# Patient Record
Sex: FEMALE | Race: WHITE | Employment: FULL TIME | URBAN - METROPOLITAN AREA
[De-identification: names, ages, dates, MRNs, and addresses within clinical notes are randomized per-mention and may not be internally consistent; named-entity substitution may affect disease eponyms.]

---

## 2018-05-14 ENCOUNTER — HOSPITAL ENCOUNTER (OUTPATIENT)
Dept: LAB | Age: 43
Discharge: HOME | End: 2018-05-14

## 2018-05-14 LAB — SENTARA SPECIMEN COL,SENBCF: NORMAL

## 2018-05-14 PROCEDURE — 99001 SPECIMEN HANDLING PT-LAB: CPT | Performed by: PHYSICIAN ASSISTANT

## 2018-07-10 ENCOUNTER — HOSPITAL ENCOUNTER (OUTPATIENT)
Dept: GENERAL RADIOLOGY | Age: 43
Discharge: HOME | End: 2018-07-10
Payer: COMMERCIAL

## 2018-07-10 DIAGNOSIS — M54.50 LUMBAGO: ICD-10-CM

## 2018-07-10 PROCEDURE — 72100 X-RAY EXAM L-S SPINE 2/3 VWS: CPT

## 2018-07-20 ENCOUNTER — HOSPITAL ENCOUNTER (OUTPATIENT)
Dept: PHYSICAL THERAPY | Age: 43
Discharge: HOME | End: 2018-07-20
Payer: COMMERCIAL

## 2018-07-20 PROCEDURE — 97161 PT EVAL LOW COMPLEX 20 MIN: CPT

## 2018-07-20 PROCEDURE — 97110 THERAPEUTIC EXERCISES: CPT

## 2018-07-20 NOTE — PROGRESS NOTES
PHYSICAL THERAPY - DAILY TREATMENT NOTE    Patient Name: Gary Bashir        Date: 2018  : 1975   YES Patient  Verified  Visit #:   1     Insurance: Payor: Mariam Comment / Plan: [employer] HMO / Product Type: HMO /      In time: 10:10 Out time: 10:55   Total Treatment Time: 45     Medicare Time Tracking (below)   Total Timed Codes (min):  NA 1:1 Treatment Time:  NA     TREATMENT AREA = Low back pain [M54.5]    SUBJECTIVE    Pain Level (on 0 to 10 scale):  0  / 10   Medication Changes/New allergies or changes in medical history, any new surgeries or procedures? YES    If yes, update Summary List   Subjective Functional Status/Changes:  []  No changes reported   CC: Low back pain    LAVERN/HPI: Pt states she has had chronic low back for a couple years but it would come then go away. Pt states she was \"crooked at one point. \" Pt states her back pain typically increases when she sits too long or bending and lifting without being too careful. On , she was gardening, raking the leaves, and other yard tasks. She attempted to start the  (which she doesn't do) and that's when her low back started hurting. Pt states when she woke up on  she had increased low back pain and was leaning to the right. 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 Pt reports sensitivity in the front of the leg. Pt states she will occasionally feel \"throbbing\" into right foot. Pt denies changes in bowel/bladder. Pt states since the incident her low back pain is progressively getting better. Pt sees chiropractor every 4 weeks. Pain  Today:0/10  Best:0/10  Worst:4/10    Aggravating factors:Prolonged sitting, driving,     Relieving Factors:Lay on belly propped on elbows, prone pressups, or lay on right side with pillow/blanket under right hip, Ice. MDT based exercises provided by chiropractor. Past History/Treatments: Chiropractic care every 4 weeks to low back maintenance.     Pain with functional activities:  [] Yes [x] No Sit<>stand  [x] Yes [] No Squatting  [x] Yes [] No Lifting  [x] Yes [] No Pushing/pulling  [x] Yes [] No   Chores/yardwork  [] Yes [x] No Stairs  [] Yes [x] No Walking    Diagnostic Tests: [] Lab work [x] X-rays    [] CT [] MRI     [] Other:  Results: degenerative changes in low back. Chiropractor stated she has a bulging disc based on x-ray. OBJECTIVE  Observation/Posture: Forward held head, rounded shoulders, mild kyphotic posture. Pelvic symmetry: Normal pelvic symmetry noted in seated and standing position. No TTP over bilateral SIJ. Palpation:Pt TTP and increased tissue turgor along right L/S paraspinals compared to left. Gait: No major gait deficits noted    Squat mechanics: Dynamic valgus noted bilaterally. Active Movements:   ROM  AROM % PROM Comments:pain, area   Forward flexion 40-60 75  Decreased ability to reverse L/S lordotic curvature. Extension 20-30 33     SB right 20-30 18     SB left 20-30 30     Rotation right 5-10 75%     Rotation left 5-10 100%       Repeated Movements   KENTRELL: centralized symptoms into Distal ITB from right foot.   KATE: centralized symptoms into right hip  REIL: centralized symptoms into level of L/S    LE Strength   Left Right   Hip flexion 4+/5 4/5   Hip abduction 4+/5 4-/5   Hip extension 4+/5 4/5   Hip IR 4+/5 4/5   Hip ER 4+/5 4-/5   Knee extension 4+/5 4+/5   Knee flexion 4+/5 4+/5   Ankle PF 4+/5 4+/5   Ankle DF 4+/5 4+/5     Dural Mobility:  SLR Sitting: [] R    [] L    [] +    [] -  @ (degrees):           Supine: [x] R    [x] L    [] +    [x] -  @ (degrees):   Slump Test: [x] R    [x] L    [] +    [x] -  @ (degrees):           Flexibility Deficits: Matias's: [] R    [] L    [] +    [] -     Matty: [] R    [] L    [] +    [] -     Hamstrings 90/90: (+) bilaterally, left: mild right: moderate tightness      Therapeutic Procedures:  Min Procedure Specifics + Rationale   n/a [x]  Patient Education (performed throughout session) [x] Review HEP    [] Progressed/Changed HEP based on:   [] proper performance and advancement of Therex/TA   [] reduction in pain level    [] increased functional capacity       [] change in directional preference   10 [x] Therapeutic Exercise   [x]  See Flowsheet   Rationale: increase ROM and increase strength to improve the patients ability to participate in ADL's      Other Objective/Functional Measures:    PT initiated extension  Based faby program. Pt demonstrated and verbalized independence with HEP. Pt left PT session with symptoms centralized into L/S     Post Treatment Pain Level (on 0 to 10) scale:   0  / 10     ASSESSMENT    Assessment/Changes in Function:See POC     Justification for Eval Code Complexity: low  Patient History (low 0, mod 1-2, high 3-4): Moderate: chronic low erna pain with recent exacerbation   Examination (low 1-2, mod 3+, high 4+): Moderate: decreased L/S mobility, decreased right HS flexibility, decreased activity tolerance. Clinical Presentation (low: stable/uncomplicated; mod: evolving; high: unstable/unpredictable): Low  Clinical Decision Making (low , mod 26-74, high 1-25):  Moderate: FOTO: 54    [x]  See Plan of Care  []  See Progress Note/ Recertification  []  See Discharge Summary        Patient will continue to benefit from skilled PT services to modify and progress therapeutic interventions, address functional mobility deficits, address ROM deficits, address strength deficits, analyze and address soft tissue restrictions, analyze and cue movement patterns, analyze and modify body mechanics/ergonomics, assess and modify postural abnormalities, address imbalance/dizziness and instruct in home and community integration  to attain remaining goals   Progress toward goals / Updated goals:    See POC     PLAN    [x]  Upgrade activities as tolerated  [x]  Update interventions per flow sheet YES Continue plan of care   []  Discharge due to :    [] Other:      Therapist: Cesar Valdes DPT    Date: 7/20/2018 Time: 7:44 AM   Future Appointments  Date Time Provider Aga Velazco   7/20/2018 10:00 AM Kamari Bui PT [facility]

## 2018-07-20 NOTE — PROGRESS NOTES
[address] [facility] PHYSICAL THERAPY  [address] [facility] [address], [address] - Phone: [phone]  Fax: [phone] / [address] [facility]  Patient Name: Nima Solitario : 1975   Medical   Diagnosis: Low back pain [M54.5] Treatment Diagnosis: L/S radiculopathy   Onset Date: Chronic with exacerbation 18     Referral Source: Shirley Lindo MD [facility] [place]): 2018   Prior Hospitalization: See medical history Provider #: [id number]   Prior Level of Function: Independent, recreationally active 3+x weekly   Comorbidities: Chronic low back pain   Medications: Verified on Patient Summary List   The Plan of Care and following information is based on the information from the initial evaluation.   ===========================================================================================  Assessment / key information:  Pt is a 44year old female who presents to PT today with c/o of chronic low back pain with recent exacerbation on 7/3/18 after attempting to start . Upon evaluation, signs and symptoms consistent with L/S radiculopathy. Pt presents with decreased L/S AROM, radicular symptoms into dorsal surface of right LE post prolonged seated positioning, decreased LE flexibility and decreased LE proximal hip strength. Pt states he has stopped all recreational activity besides daily walking program due to increase in low back pain. Pt responded well to MDT based exercises and demonstrated ability to centralize symptoms to the level of the L/S. Pt would benefit from skilled PT to address functional deficits listed above and facilitate return to prior level of recreational activity. OBJECTIVE  Observation/Posture: Forward held head, rounded shoulders, mild kyphotic posture.      Pelvic symmetry: Normal pelvic symmetry noted in seated and standing position.  No TTP over bilateral SIJ.      Palpation:Pt TTP and increased tissue turgor along right L/S paraspinals compared to left.      Gait: No major gait deficits noted     Squat mechanics: Dynamic valgus noted bilaterally.     Active Movements:   ROM  AROM % PROM Comments:pain, area   Forward flexion 40-60 75   Decreased ability to reverse L/S lordotic curvature. Extension 20-30 33       SB right 20-30 18       SB left 20-30 30       Rotation right 5-10 75%       Rotation left 5-10 100%          Repeated Movements   KENTRELL: centralized symptoms into Distal ITB from right foot.   KATE: centralized symptoms into right hip  REIL: centralized symptoms into level of L/S     LE Strength    Left Right   Hip flexion 4+/5 4/5   Hip abduction 4+/5 4-/5   Hip extension 4+/5 4/5   Hip IR 4+/5 4/5   Hip ER 4+/5 4-/5   Knee extension 4+/5 4+/5   Knee flexion 4+/5 4+/5   Ankle PF 4+/5 4+/5   Ankle DF 4+/5 4+/5      Dural Mobility:  SLR Sitting:               [] R    [] L    [] +    [] -  @ (degrees):           Supine:              [x] R    [x] L    [] +    [x] -  @ (degrees):   Slump Test:               [x] R    [x] L    [] +    [x] -  @ (degrees):            Flexibility Deficits:                     Matias's:           [] R    [] L    [] +    [] -                                               Matty:                    [] R    [] L    [] +    [] -                                               Hamstrings 90/90: (+) bilaterally, left: mild right: moderate tightness       ===========================================================================================  Eval Complexity: History: MEDIUM  Complexity : 1-2 comorbidities / personal factors will impact the outcome/ POC Exam:MEDIUM Complexity : 3 Standardized tests and measures addressing body structure, function, activity limitation and / or participation in recreation  Presentation: LOW Complexity : Stable, uncomplicated  Clinical Decision Making:MEDIUM Complexity : FOTO score of 26-74Overall Complexity:LOW     Problem List: pain affecting function, decrease ROM, decrease strength, decrease ADL/ functional abilitiies, decrease activity tolerance and decrease flexibility/ joint mobility   Treatment Plan may include any combination of the following: Therapeutic exercise, Therapeutic activities, Neuromuscular re-education, Physical agent/modality, Gait/balance training, Manual therapy, Aquatic therapy, Patient education, Self Care training, Functional mobility training, Home safety training and Stair training  Patient / Family readiness to learn indicated by: asking questions, trying to perform skills and interest  Persons(s) to be included in education: patient (P)  Barriers to Learning/Limitations: None  Measures taken: NA   Patient Goal (s): Return to prior level of recreational activity   Patient self reported health status: excellent  Rehabilitation Potential: excellent   Short Term Goals: To be accomplished in  2  weeks:  1. Pt will be compliant with HEP for symptom management at home. 2. Pt will demonstrate independence with ability centralize radicular symptoms to level of the L/S in order to increase compliance with PT program.  3. Pt will return to prior level of recreational activity at least 1 day per week in order to facilitate return to PLOF.  Long Term Goals: To be accomplished in  4  weeks:  1. Pt will be independent with HEP at D/C for self management. 2. Pt will tolerate a weeks worth of desired recreational activity with </=1/10 low back pain in order to return to PLOF  3. Pt to increase FOTO score to > 75 to indicate improved functional independence. 4. Pt will increase right hip abduction strength to at least 4/5 in order to increase pelvic stability during stance phase of gait.   Frequency / Duration:   Patient to be seen  2  times per week for 4-6  weeks:  Patient / Caregiver education and instruction: self care, activity modification and exercises  G-Codes (GP): NA  Therapist Signature: Alex Fabian PT Date: 0/92/9645   Certification Period: NA Time: 7:46 AM   ===========================================================================================  I certify that the above Physical Therapy Services are being furnished while the patient is under my care. I agree with the treatment plan and certify that this therapy is necessary. Physician Signature:        Date:       Time:     Please sign and return to [facility] or you may fax the signed copy to [phone]. Thank you.

## 2018-07-25 ENCOUNTER — HOSPITAL ENCOUNTER (OUTPATIENT)
Dept: PHYSICAL THERAPY | Age: 43
Discharge: HOME | End: 2018-07-25
Payer: COMMERCIAL

## 2018-07-25 PROCEDURE — 97110 THERAPEUTIC EXERCISES: CPT

## 2018-07-25 PROCEDURE — 97140 MANUAL THERAPY 1/> REGIONS: CPT

## 2018-07-25 NOTE — PROGRESS NOTES
PHYSICAL THERAPY - DAILY TREATMENT NOTE    Patient Name: Gary Bashir        Date: 2018  : 1975   YES Patient  Verified  Visit #:   2   of     Insurance: Payor: Mariam Comment / Plan: [employer] HMO / Product Type: HMO /      In time: 10:00 Out time: 10:41   Total Treatment Time: 41     TREATMENT AREA = Low back pain [M54.5]    SUBJECTIVE    Pain Level (on 0 to 10 scale):  1  / 10   Medication Changes/New allergies or changes in medical history, any new surgeries or procedures? NO    If yes, update Summary List   Subjective Functional Status/Changes:  []  No changes reported     \"I've been able to get the pain all the way to my back but not that often. It's in the top (1/3) of the hip/thigh right now (anterolaterally)\"          OBJECTIVE  Therapeutic Procedures:  Min Procedure Specifics + Rationale   n/a [x]  Patient Education (performed throughout session) [x] Review HEP    [] Progressed/Changed HEP based on:   [] proper performance and advancement of Therex/TA   [] reduction in pain level    [] increased functional capacity       [] change in directional preference   23 [x] Therapeutic Exercise   [x]  See Flowsheet   Rationale: increase ROM and increase strength to improve the patients ability to participate in ADL's    8 []  Manual Therapy [] STM/DTM     [] IASTM     [] Scar Massage  [] X-friction       [] PNF         [] PROM    [] TDN (see objective; actual needle insertion time not billed)   [] Soft tissue mobz   [] Joint mobz: Gr I [] II []  III [] IV[] V[]:  DQNH with OP;  Extension mobilization in prone    Rationale: decrease pain, increase ROM, increase tissue extensibility, decrease trigger points and increase postural awareness to attain functional use and participation with ADL's     Modality rationale: decrease inflammation, decrease pain, increase tissue extensibility and increase muscle contraction/control to improve the patients ability to perform ADL's with greater ease   Min Type Additional Details   10 [x]  Heat          [] pre-ABDIRAHMAN          [x] post-ABDIRAHMAN Location:L/S    [] supine              [] prone  [] legs elevated    [] legs flat   [] Skin assessment post-treatment:  []intact []redness- no adverse reaction       []redness  adverse reaction:     Other Objective/Functional Measures:    Patient educated on purpose of centralization, neurophysiological effects, and biomechanical effects in relation to her condition. Patient educated on use of HEP as a method to further centralize and reduce symptoms independently outside of clinic and therapist intervention. She verbalized understanding that central symptoms are an improvement over peripheral symptoms. Post Treatment Pain Level (on 0 to 10) scale:   1C  / 10     ASSESSMENT    Assessment/Changes in Function:     Posterior derangement appears to be confirmed         Patient will continue to benefit from skilled PT services to modify and progress therapeutic interventions, address functional mobility deficits, address ROM deficits, address strength deficits, analyze and address soft tissue restrictions, analyze and cue movement patterns, instruct in home and community integration and radiculopathy  to attain remaining goals   Progress toward goals / Updated goals:    1st session since initial eval, no significant progress noted. PLAN    [x]  Upgrade activities as tolerated  [x]  Update interventions per flow sheet YES Continue plan of care   []  Discharge due to :    []  Other:      Therapist: Vicky Brito \"BJ\" GIUSEPPE Kelly, Cert. MDT, Cert. DN, Cert.  SMT    Date: 7/25/2018 Time: 10:02 AM   Future Appointments  Date Time Provider Aga Velazco   7/27/2018 10:00 AM Burma Erasmo, [facility]   8/3/2018 1:00 PM Burma Erasmo, [facility]   8/8/2018 9:30 AM Burma Erasmo, [facility]   8/10/2018 10:00 AM Burma Erasmo, [facility]   8/15/2018 9:30 AM Burma Erasmo, [facility]   8/17/2018 10:00 AM Burma Erasmo, [facility]   8/22/2018 9:30 AM Kanika Dee PT [facility]   8/24/2018 10:30 AM Kanika Dee, PT [facility]   8/29/2018 9:30 AM Kanika Dee, PT [facility]   8/31/2018 10:30 AM Kanika Dee, PT [facility]

## 2018-07-27 ENCOUNTER — HOSPITAL ENCOUNTER (OUTPATIENT)
Dept: PHYSICAL THERAPY | Age: 43
Discharge: HOME | End: 2018-07-27
Payer: COMMERCIAL

## 2018-07-27 PROCEDURE — 97140 MANUAL THERAPY 1/> REGIONS: CPT

## 2018-07-27 PROCEDURE — 97110 THERAPEUTIC EXERCISES: CPT

## 2018-07-27 NOTE — PROGRESS NOTES
PHYSICAL THERAPY - DAILY TREATMENT NOTE Patient Name: Keara Ferguson        Date: 2018 : 1975   YES Patient  Verified Visit #:   3   of     Insurance: Payor: Jake Morataya / Plan: [place] [employer] H[employer] / Product Type: HMO /   
 
In time: 10:03 Out time: 10:50 Total Treatment Time: 52 TREATMENT AREA = Low back pain [M54.5] SUBJECTIVE Pain Level (on 0 to 10 scale):  0  / 10 Medication Changes/New allergies or changes in medical history, any new surgeries or procedures? NO    If yes, update Summary List  
Subjective Functional Status/Changes:  []  No changes reported \"Very very little down the foot/leg today. \"  
 
  
 
OBJECTIVE Therapeutic Procedures: 
Min Procedure Specifics + Rationale  
n/a [x]  Patient Education (performed throughout session) [x] Review HEP [] Progressed/Changed HEP based on:  
[] proper performance and advancement of Therex/TA [] reduction in pain level   
[] increased functional capacity [] change in directional preference 29 [x] Therapeutic Exercise [x]  See Flowsheet Rationale: increase ROM and increase strength to improve the patients ability to participate in ADL's   
8 []  Manual Therapy [] STM/DTM     [] IASTM     [] Scar Massage [] X-friction       [] PNF         [] PROM [] TDN (see objective; actual needle insertion time not billed)  
[] Soft tissue mobz  
[] Joint mobz: Gr I [] II []  III [] IV[] V[]: 
LQMF with OP; Extension mobilization in prone Rationale: decrease pain, increase ROM, increase tissue extensibility, decrease trigger points and increase postural awareness to attain functional use and participation with ADL's Modality rationale: decrease inflammation, decrease pain, increase tissue extensibility and increase muscle contraction/control to improve the patients ability to perform ADL's with greater ease Min Type Additional Details 10 [x]  Heat          [] pre-ABDIRAHMAN          [x] post-ABDIRAHMAN Location:L/S   
[] supine [x] prone 
[] legs elevated    [] legs flat  
[] Skin assessment post-treatment:  []intact []redness- no adverse reaction 
     []redness  adverse reaction:  
 
Other Objective/Functional Measures: Added stabilization therex per flow sheet. Report of peripheralization during s/l R clams. Performed KENTRELL/REIL throughout treatment b/w sets/reps/exercises as needed as prophylaxis and symptoms management. Educated patient of various forms of extensions using plinth/counter top, hanging on mobility sticks, and extensions in water. Post Treatment Pain Level (on 0 to 10) scale:   0  / 10 ASSESSMENT Assessment/Changes in Function:  
 
Derangement continuing to improve in reducing Patient will continue to benefit from skilled PT services to modify and progress therapeutic interventions, address functional mobility deficits, address ROM deficits, address strength deficits, analyze and address soft tissue restrictions, analyze and cue movement patterns, analyze and modify body mechanics/ergonomics and instruct in home and community integration  to attain remaining goals Progress toward goals / Updated goals: 
 
Progressing well inc centralization PLAN [x]  Upgrade activities as tolerated 
[x]  Update interventions per flow sheet YES Continue plan of care  
[]  Discharge due to :   
[]  Other:   
 
Therapist: Austin Allen \"BJ\" GIUSEPPE Kelly, Cert. MDT, Cert. DN, Cert. SMT Date: 7/27/2018 Time: 10:02 AM  
Future Appointments Date Time Provider Aga Velazco 8/3/2018 1:00 PM Divina Salazar PT [facility]  
8/8/2018 9:30 AM Divina Salazar PT [facility]  
8/10/2018 10:00 AM Divina Salazar PT [facility]  
8/15/2018 9:30 AM Divina Salazar PT [facility]  
8/17/2018 10:00 AM Divina Salazar PT [facility]  
8/22/2018 9:30 AM Divina Salazar PT [facility]  
8/24/2018 10:30 AM Divina Salazar PT [facility]  
8/29/2018 9:30 AM Divina Salazar PT [facility]  
8/31/2018 10:30 AM Divina Salazar PT [facility]

## 2018-08-03 ENCOUNTER — HOSPITAL ENCOUNTER (OUTPATIENT)
Dept: PHYSICAL THERAPY | Age: 43
Discharge: HOME | End: 2018-08-03
Payer: COMMERCIAL

## 2018-08-03 PROCEDURE — 97110 THERAPEUTIC EXERCISES: CPT

## 2018-08-08 ENCOUNTER — HOSPITAL ENCOUNTER (OUTPATIENT)
Dept: PHYSICAL THERAPY | Age: 43
Discharge: HOME | End: 2018-08-08
Payer: COMMERCIAL

## 2018-08-08 PROCEDURE — 97530 THERAPEUTIC ACTIVITIES: CPT

## 2018-08-08 PROCEDURE — 97110 THERAPEUTIC EXERCISES: CPT

## 2018-08-08 NOTE — PROGRESS NOTES
PHYSICAL THERAPY - DAILY TREATMENT NOTE    Patient Name: Samuel Chatman        Date: 2018  : 1975   YES Patient  Verified  Visit #:   5     Insurance: Payor: Eunice Castaneda / Plan: [place] [employer] / Product Type: HMO /      In time: 9:31 Out time: 10:21   Total Treatment Time: 50     TREATMENT AREA = Low back pain [M54.5]    SUBJECTIVE    Pain Level (on 0 to 10 scale):  0  / 10   Medication Changes/New allergies or changes in medical history, any new surgeries or procedures? NO    If yes, update Summary List   Subjective Functional Status/Changes:  []  No changes reported     \"I've definitely been working on my glutes, which were sore. \"          OBJECTIVE  Therapeutic Procedures:  Min Procedure Specifics + Rationale   n/a [x]  Patient Education (performed throughout session) [x] Review HEP    [] Progressed/Changed HEP based on:   [] proper performance and advancement of Therex/TA   [] reduction in pain level    [] increased functional capacity       [] change in directional preference   32 [x] Therapeutic Exercise   [x]  See Flowsheet   Rationale: increase ROM and increase strength to improve the patients ability to participate in ADL's    8 [x] Therapeutic Activity   [x]  See Flowsheet; Introduced [address] and progressed quadruped therex for anti-rotational stability for housework  Rationale:  To improve safety, proprioception, coordination, and efficiency with tasks     Modality rationale: decrease inflammation, decrease pain, increase tissue extensibility and increase muscle contraction/control to improve the patients ability to perform ADL's with greater ease   Min Type Additional Details   10 [x]  Heat          [] pre-ABDIRAHMAN          [x] post-ABDIRAHMAN Location:L/S    [] supine              [x] prone  [x] legs elevated    [] legs flat   [] Skin assessment post-treatment:  []intact []redness- no adverse reaction       []redness  adverse reaction:     Other Objective/Functional Measures:    Increased reps/sets/resistance per flow sheet. Post Treatment Pain Level (on 0 to 10) scale:   0  / 10     ASSESSMENT    Assessment/Changes in Function:     Derangement reduced          Patient will continue to benefit from skilled PT services to modify and progress therapeutic interventions, address functional mobility deficits, address ROM deficits, address strength deficits, analyze and address soft tissue restrictions, analyze and cue movement patterns, analyze and modify body mechanics/ergonomics and instruct in home and community integration  to attain remaining goals   Progress toward goals / Updated goals:    Progressing in functional mobility     PLAN    [x]  Upgrade activities as tolerated  [x]  Update interventions per flow sheet YES Continue plan of care   []  Discharge due to :    []  Other:      Therapist: Virgil Obrien \"SHAHID\" Sana Matta, GIUSEPPE, Cert. MDT, Cert. DN, Cert.  SMT    Date: 8/8/2018 Time: 9:37 AM   Future Appointments  Date Time Provider Aga Velazco   8/10/2018 10:00 AM Vivienne Poag, [facility]   8/15/2018 9:30 AM Vivienne Poag, PT [facility]   8/17/2018 10:00 AM Vivienne Poag, PT [facility]   8/22/2018 9:30 AM Vivienne Poag, PT [facility]   8/24/2018 10:30 AM Vivienne Poag, PT [facility]   8/29/2018 9:30 AM Vivienne Poag, PT [facility]   8/31/2018 10:30 AM Vivienne Poag, PT [facility]

## 2018-08-10 ENCOUNTER — HOSPITAL ENCOUNTER (OUTPATIENT)
Dept: PHYSICAL THERAPY | Age: 43
Discharge: HOME | End: 2018-08-10
Payer: COMMERCIAL

## 2018-08-10 PROCEDURE — 97110 THERAPEUTIC EXERCISES: CPT

## 2018-08-10 NOTE — PROGRESS NOTES
PHYSICAL THERAPY - DAILY TREATMENT NOTE    Patient Name: Radha Frey        Date: 8/10/2018  : 1975   YES Patient  Verified  Visit #:   6     Insurance: Payor: Glenn Single / Plan: [facility] HMO / Product Type: HMO /      In time: 10:00 Out time: 10:55   Total Treatment Time: 55     TREATMENT AREA = Low back pain [M54.5]    SUBJECTIVE    Pain Level (on 0 to 10 scale):  3  / 10   Medication Changes/New allergies or changes in medical history, any new surgeries or procedures? NO    If yes, update Summary List   Subjective Functional Status/Changes:  []  No changes reported     \"Overnight I had some trouble with my back. I woke up this morning and did some exercises and it felt better, but right now I've got some sensation to my thigh.  \"          OBJECTIVE  Therapeutic Procedures:  Min Procedure Specifics + Rationale   n/a [x]  Patient Education (performed throughout session) [x] Review HEP    [] Progressed/Changed HEP based on:   [] proper performance and advancement of Therex/TA   [] reduction in pain level    [] increased functional capacity       [] change in directional preference   45 [x] Therapeutic Exercise   [x]  See Flowsheet   Rationale: increase ROM and increase strength to improve the patients ability to participate in ADL's      Modality rationale: decrease inflammation, decrease pain, increase tissue extensibility and increase muscle contraction/control to improve the patients ability to perform ADL's with greater ease   Min Type Additional Details   10 [x]  Heat          [] pre-ABDIRAHMAN          [x] post-ABDIRAHMAN Location:L/S    [] supine              [x] prone  [] legs elevated    [] legs flat   [] Skin assessment post-treatment:  []intact []redness- no adverse reaction       []redness  adverse reaction:     Other Objective/Functional Measures:    Educated patient on transitional exercises like Yoga  Able to independently centralize and abolish via MDT   Post Treatment Pain Level (on 0 to 10) scale:   0  / 10     ASSESSMENT    Assessment/Changes in Function:     Patient derangement still able to reduce despite recent exacerbation         Patient will continue to benefit from skilled PT services to modify and progress therapeutic interventions, address functional mobility deficits, address ROM deficits, address strength deficits, analyze and address soft tissue restrictions, analyze and cue movement patterns and instruct in home and community integration  to attain remaining goals   Progress toward goals / Updated goals:    Progressing well despite recent exacerbation     PLAN    [x]  Upgrade activities as tolerated  [x]  Update interventions per flow sheet YES Continue plan of care   []  Discharge due to :    []  Other:      Therapist: Jeannie Iqbal \"BJ\" Robin, GIUSEPPE, Cert. MDT, Cert. DN, Cert.  SMT    Date: 8/10/2018 Time: 10:16 AM   Future Appointments  Date Time Provider Aga Velazco   8/15/2018 9:30 AM Germain Hoover PT [facility]   8/17/2018 10:00 AM Thor Kiko PT [facility]   8/22/2018 9:30 AM Thor Kiko PT [facility]   8/24/2018 10:30 AM Thor Kiko, PT [facility]   8/29/2018 9:30 AM Thor Kiko PT [facility]   8/31/2018 10:30 AM Germain Hoover, PT [facility]

## 2018-08-15 ENCOUNTER — HOSPITAL ENCOUNTER (OUTPATIENT)
Dept: PHYSICAL THERAPY | Age: 43
Discharge: HOME | End: 2018-08-15
Payer: COMMERCIAL

## 2018-08-15 PROCEDURE — 97530 THERAPEUTIC ACTIVITIES: CPT

## 2018-08-15 PROCEDURE — 97110 THERAPEUTIC EXERCISES: CPT

## 2018-08-15 NOTE — PROGRESS NOTES
PHYSICAL THERAPY - DAILY TREATMENT NOTE    Patient Name: Davon Dougherty        Date: 8/15/2018  : 1975   YES Patient  Verified  Visit #:     Insurance: Payor: Samuel Reeves / Plan: [address] [place] [address] [place] HMO / Product Type: HMO /      In time: 9:30 Out time: 10:16   Total Treatment Time: 46     TREATMENT AREA = Low back pain [M54.5]    SUBJECTIVE    Pain Level (on 0 to 10 scale): \"tingling\"  / 10   Medication Changes/New allergies or changes in medical history, any new surgeries or procedures? NO    If yes, update Summary List   Subjective Functional Status/Changes:  []  No changes reported     \"Back and legs are fine, but I was doing bird dogs this morning and had tingling in my right hand. \"          OBJECTIVE  Therapeutic Procedures:  Min Procedure Specifics + Rationale   n/a [x]  Patient Education (performed throughout session) [x] Review HEP    [] Progressed/Changed HEP based on:   [] proper performance and advancement of Therex/TA   [] reduction in pain level    [] increased functional capacity       [] change in directional preference   23 [x] Therapeutic Exercise   [x]  See Flowsheet   Rationale: increase ROM and increase strength to improve the patients ability to participate in ADL's    5 []  Manual Therapy [] STM/DTM -(See Details Below)   [] IASTM  (See Table Below)  [] Scar Massage  [] X-friction       [] PNF         [] PROM   [] TDN (see objective; actual needle insertion time not billed)   [] Joint mobilization[de-identified] Gr I [] II []  III [] IV[] V[]: (See Details Below)  Carpal manipulation to right hand to allow for performance of therex  Rationale: decrease pain, increase ROM, increase tissue extensibility, decrease trigger points and increase postural awareness to attain functional use and participation with ADL's  [] Skin assessment post-treatment:  []intact []redness- no adverse reaction   []redness  adverse reaction:      8 [x] Therapeutic Activity   [x]  See Flowsheet; Oov supine march.  UE flexion, Dead bug  Rationale: To improve safety, proprioception, coordination, and efficiency with tasks     Modality rationale: decrease inflammation, decrease pain, increase tissue extensibility and increase muscle contraction/control to improve the patients ability to perform ADL's with greater ease   Min Type Additional Details   10 [x]  Heat          [] pre-ABDIRAHMAN          [x] post-ABDIRAHMAN Location:L/S    [x] supine              [] prone  [x] legs elevated    [] legs flat   [x] Skin assessment post-treatment:  []intact []redness- no adverse reaction       []redness  adverse reaction:     Other Objective/Functional Measures:    Hand paraesthesia abolished after manipulation  Right paraesthesia upon initial rep of LE march on right with oov, but able to abolish when corrected to engage core. Post Treatment Pain Level (on 0 to 10) scale:   0  / 10     ASSESSMENT    Assessment/Changes in Function:     Derangement not fully reduced         Patient will continue to benefit from skilled PT services to modify and progress therapeutic interventions, address functional mobility deficits, address ROM deficits, address strength deficits, analyze and address soft tissue restrictions, analyze and cue movement patterns, analyze and modify body mechanics/ergonomics and instruct in home and community integration  to attain remaining goals   Progress toward goals / Updated goals:    Progressing in centralized state for ADL's     PLAN    [x]  Upgrade activities as tolerated  [x]  Update interventions per flow sheet YES Continue plan of care   []  Discharge due to :    []  Other:      Therapist: Ana María Finch \"BJ\" Karo Valdivia, DPT, Cert. MDT, Cert. DN, Cert.  SMT    Date: 8/15/2018 Time: 10:02 AM   Future Appointments  Date Time Provider Aga Velazco   8/17/2018 10:00 AM [place] Salbador, [facility]   8/22/2018 9:30 AM Dimas Salbador, [facility]   8/24/2018 10:30 AM Dimas Salbador, [facility]   8/29/2018 9:30 AM [place] Salbador, [facility] 8/31/2018 10:30 AM Harjit Castaneda PT [facility]

## 2018-08-17 ENCOUNTER — HOSPITAL ENCOUNTER (OUTPATIENT)
Dept: PHYSICAL THERAPY | Age: 43
Discharge: HOME | End: 2018-08-17
Payer: COMMERCIAL

## 2018-08-17 PROCEDURE — 97110 THERAPEUTIC EXERCISES: CPT

## 2018-08-17 PROCEDURE — 97140 MANUAL THERAPY 1/> REGIONS: CPT

## 2018-08-17 PROCEDURE — 97530 THERAPEUTIC ACTIVITIES: CPT

## 2018-08-17 PROCEDURE — 97112 NEUROMUSCULAR REEDUCATION: CPT

## 2018-08-17 NOTE — PROGRESS NOTES
PHYSICAL THERAPY - DAILY TREATMENT NOTE    Patient Name: Shorty Parra        Date: 2018  : 1975   YES Patient  Verified  Visit #:   8     Insurance: Payor: Nabila Dewey / Plan: [facility] HMO / Product Type: HMO /      In time: 10:02 Out time: 10:58   Total Treatment Time: 58     TREATMENT AREA = Low back pain [M54.5]    SUBJECTIVE    Pain Level (on 0 to 10 scale):  0  / 10   Medication Changes/New allergies or changes in medical history, any new surgeries or procedures? NO    If yes, update Summary List   Subjective Functional Status/Changes:  []  No changes reported     \"Some tingling in my leg today. It started late afternoon/evening. I can't recall, maybe when I folded over forward \"          OBJECTIVE  Therapeutic Procedures:  Min Procedure Specifics + Rationale   n/a [x]  Patient Education (performed throughout session) [x] Review HEP    [] Progressed/Changed HEP based on:   [] proper performance and advancement of Therex/TA   [] reduction in pain level    [] increased functional capacity       [] change in directional preference   15 [x] Therapeutic Exercise   [x]  See Flowsheet   Rationale: increase ROM and increase strength to improve the patients ability to participate in ADL's    15 []  Manual Therapy [x] STM/DTM -(See Details Below)   [x] IASTM  (See Table Below)  [] Scar Massage  [] X-friction       [] PNF         [] PROM   [] TDN (see objective; actual needle insertion time not billed)   [] Joint mobilization[de-identified] Gr I [] II []  III [] IV[] V[]: (See Details Below)  Lumbosacral   Instrument Position Techniques   FRAMS Prone with pillow under umbilicus area  R4/W8 cephalo/caudally. F2/F3 laterally. F4 at the interspinus space    Prone with pillow under umbilicus area O0/B4 over posterior-lateral buttocks. F3/F4 over SI joint line.  F3/F4 laterally and between the L4-S1 spinous process   Cups Prone with pillow under umbilicus area Dynamic cupping to the erector spinae    Prone with pillow under umbilicus area for placement Prone press-up with static cup position. Standing AROM flexion and extension. REIL with OP; Extension mobilization in prone  Rationale: decrease pain, increase ROM, increase tissue extensibility, decrease trigger points and increase postural awareness to attain functional use and participation with ADL's  [] Skin assessment post-treatment:  []intact []redness- no adverse reaction   []redness  adverse reaction:      8 [x] Therapeutic Activity   [x]  See Flowsheet;   Cups Prone with pillow under umbilicus area for placement Prone press-up with static cup position. Standing AROM flexion and extension. Rationale:  To improve safety, proprioception, coordination, and efficiency with tasks     8 min Neuromuscular Re-ed: Loaded carries in varied hand positions bilaterally for time   Rationale:         Modality rationale: decrease inflammation, decrease pain, increase tissue extensibility and increase muscle contraction/control to improve the patients ability to perform ADL's with greater ease   Min Type Additional Details   10 [x]  Heat          [] pre-ABDIRAHMAN          [x] post-ABDIRAHMAN Location:L/S    [] supine              [] prone  [] legs elevated    [] legs flat   [x] Skin assessment post-treatment:  []intact []redness- no adverse reaction       []redness  adverse reaction:     Other Objective/Functional Measures:    Implemented MDT to centralize symptoms prior to manual  Neuro-re-ed post manual     Post Treatment Pain Level (on 0 to 10) scale:   0  / 10     ASSESSMENT    Assessment/Changes in Function:     Derangement exacerbated to L/S able to be reduced and abolished, performed loaded carries with centralized state         Patient will continue to benefit from skilled PT services to modify and progress therapeutic interventions, address functional mobility deficits, address ROM deficits, address strength deficits, analyze and address soft tissue restrictions, analyze and cue movement patterns, analyze and modify body mechanics/ergonomics and instruct in home and community integration  to attain remaining goals   Progress toward goals / Updated goals:    Progressing in derangement reduction, though not fully reduced     PLAN    [x]  Upgrade activities as tolerated  [x]  Update interventions per flow sheet YES Continue plan of care   []  Discharge due to :    []  Other:      Therapist: Jamel Elizalde \"BJ\" GIUSEPPE Kelly, Cert. MDT, Cert. DN, Cert.  SMT    Date: 8/17/2018 Time: 10:10 AM   Future Appointments  Date Time Provider Aga Velazco   8/22/2018 9:30 AM Harjit Castaneda [facility]   8/24/2018 10:30 AM Harjit Castaneda PT [facility]   8/29/2018 9:30 AM Harjit Castaneda PT [facility]   8/31/2018 10:30 AM Harjit Castaneda PT [facility]

## 2018-08-22 ENCOUNTER — HOSPITAL ENCOUNTER (OUTPATIENT)
Dept: PHYSICAL THERAPY | Age: 43
Discharge: HOME | End: 2018-08-22
Payer: COMMERCIAL

## 2018-08-22 PROCEDURE — 97140 MANUAL THERAPY 1/> REGIONS: CPT

## 2018-08-22 PROCEDURE — 97112 NEUROMUSCULAR REEDUCATION: CPT

## 2018-08-22 NOTE — PROGRESS NOTES
[address] [facility] PHYSICAL THERAPY  [address] [facility] [address], [address] - Phone: [phone]  Fax: [phone]  PROGRESS NOTE  Patient Name: Maine Cowart : 1975   Treatment/Medical Diagnosis: Low back pain [M54.5]   Referral Source: Basil Peñaloza MD     Date of Initial Visit: 18 Attended Visits: 9 Missed Visits: 0     SUMMARY OF TREATMENT  Patient's POC has consisted of therex, therapeutic activities, manual therapy prn, modalities prn, pt. education, and a comprehensive HEP. Treatment strategies used to address functional mobility deficits, ROM deficits, strength deficits, analyze and address soft tissue restrictions, analyze and cue movement patterns, analyze and modify body mechanics/ergonomics, assess and modify postural abnormalities and instruct in home and community integration. CURRENT STATUS  Patient making excellent progress in addressing her core stability and paraesthesia. She demonstrates improvement, though continued limitation in proprioception and kinesthetic awareness re: her left LE, resulting in balance dysfunction. Patient revealed hx of C/S derangement as she attended a session with complaints of Right hand paraesthesia. We were able to centralize and abolish her UE paraesthesia and continue in treatment of her L/S without difficulty. Goal/Measure of Progress Goal Met? · Short Term Goals: To be accomplished in  2  weeks:  1. Pt will be compliant with HEP for symptom management at home. 2. Pt will demonstrate independence with ability centralize radicular symptoms to level of the L/S in order to increase compliance with PT program.  3. Pt will return to prior level of recreational activity at least 1 day per week in order to facilitate return to PLOF. · Long Term Goals: To be accomplished in  4  weeks:  1. Pt will be independent with HEP at D/C for self management.   2. Pt will tolerate a weeks worth of desired recreational activity with </=1/10 low back pain in order to return to PLOF  3. Pt to increase FOTO score to > 75 to indicate improved functional independence. 4. Pt will increase right hip abduction strength to at least 4/5 in order to increase pelvic stability during stance phase of gait. MET    MET        MET          Ongoing    Partially Met      Ongoing    MET     New Goals to be achieved in __2-3__  weeks:  1. Pt will be independent with HEP at D/C for self management. 2. Pt will tolerate a weeks worth of desired recreational activity with </=1/10 low back pain in order to return to PLOF  Frequency / Duration:   Patient to be seen  2-3  times per week for 2-3  weeks:    G-Codes: n/a  RECOMMENDATIONS  Continue and progress functional therex/therapeutic activity as able, utilizing manual therapy and modalities prn. Progress patient towards independent HEP to facilitate self-management of symptoms and progress gains after PT. If you have any questions/comments please contact us directly at [phone]. Thank you for allowing us to assist in the care of your patient. Therapist Signature: Jeannie Iqbal \"BJ\" jE Trujillo, DPT, Cert. MDT, Cert. DN, Cert. SMT Date: 2/77/4391   Certification Period: n/a     Reporting Period n/a   Time: 10:07 AM   NOTE TO PHYSICIAN:  PLEASE COMPLETE THE ORDERS BELOW AND FAX TO   [facility]: [phone]. If you are unable to process this request in 24 hours please contact our office: [phone].    ___ I have read the above report and request that my patient continue as recommended.   ___ I have read the above report and request that my patient continue therapy with the following changes/special instructions:_________________________________________________________   ___ I have read the above report and request that my patient be discharged from therapy.      Physician Signature:        Date:       Time:

## 2018-08-22 NOTE — PROGRESS NOTES
PHYSICAL THERAPY - DAILY TREATMENT NOTE    Patient Name: Radha Frey        Date: 2018  : 1975   YES Patient  Verified  Visit #:     Insurance: Payor: Glenn Single / Plan: [facility] HMO / Product Type: HMO /      In time: 9:15 Out time: 10:15   Total Treatment Time: 45     TREATMENT AREA = Low back pain [M54.5]    SUBJECTIVE    Pain Level (on 0 to 10 scale):  0  / 10   Medication Changes/New allergies or changes in medical history, any new surgeries or procedures? NO    If yes, update Summary List   Subjective Functional Status/Changes:  []  No changes reported     \"Just a little sensation to my thigh, but it goes away. I still get some of that numbness to the back of the right hand. \"          OBJECTIVE  Therapeutic Procedures:  Min Procedure Specifics + Rationale   n/a [x]  Patient Education (performed throughout session) [x] Review HEP    [] Progressed/Changed HEP based on:   [] proper performance and advancement of Therex/TA   [] reduction in pain level    [] increased functional capacity       [] change in directional preference   10 []  Manual Therapy [] STM/DTM -(See Details Below)   [] IASTM  (See Table Below)  [] Scar Massage  [] X-friction       [] PNF         [] PROM   [] TDN (see objective; actual needle insertion time not billed)   [] Joint mobilization[de-identified] Gr I [] II []  III [] IV[] V[]: (See Details Below)  Supine repeated retraction mobilization  Cervical (C6-7) left Rotatory HVLAT (Cradle Hold)  REIL with OP; Extension mobilization in prone  Rationale: decrease pain, increase ROM, increase tissue extensibility, decrease trigger points and increase postural awareness to attain functional use and participation with ADL's  [] Skin assessment post-treatment:  []intact []redness- no adverse reaction   []redness  adverse reaction:      25 [x] Neuromuscular Re-ed   [x]  See Flowsheet;   Oov H/L UE/LE march, UE ab/add, BKFO, H/L Clams, S/L Clams, Bird Dog (UE Only), Decompression  Rationale: increase ROM, increase strength, improve coordination, improve balance and increase proprioception  to improve the patients ability to safely participate in ADL's     Modality rationale: decrease inflammation, decrease pain, increase tissue extensibility and increase muscle contraction/control to improve the patients ability to perform ADL's with greater ease   Min Type Additional Details   10 [x]  Heat          [] pre-ABDIRAHMAN          [x] post-ABDIRAHMAN Location:L/S    [x] supine              [] prone  [x] legs elevated    [] legs flat   [x] Skin assessment post-treatment:  []intact []redness- no adverse reaction       []redness  adverse reaction:     Other Objective/Functional Measures:    Assessed Right hand paraesthesia secondary to its hindrance on therex for L/S. Progressed Oov activities per above     Post Treatment Pain Level (on 0 to 10) scale:   0  / 10     ASSESSMENT    Assessment/Changes in Function:     See PN         Patient will continue to benefit from skilled PT services to modify and progress therapeutic interventions, address functional mobility deficits, address ROM deficits, address strength deficits, analyze and address soft tissue restrictions, analyze and cue movement patterns, analyze and modify body mechanics/ergonomics and instruct in home and community integration  to attain remaining goals   Progress toward goals / Updated goals:    See PN     PLAN    [x]  Upgrade activities as tolerated  [x]  Update interventions per flow sheet YES Continue plan of care   []  Discharge due to :    []  Other:      Therapist: Kayla Briceno \"SHAHID\" David Head DPT, Cert. MDT, Cert. DN, Cert.  SMT    Date: 8/22/2018 Time: 9:57 AM   Future Appointments  Date Time Provider Aga Velazco   8/24/2018 10:30 AM Keya Griffin [facility]   8/29/2018 9:30 AM Keya Griffin PT [facility]   8/31/2018 10:30 AM Keya Griffin [facility]

## 2018-08-24 ENCOUNTER — HOSPITAL ENCOUNTER (OUTPATIENT)
Dept: PHYSICAL THERAPY | Age: 43
Discharge: HOME | End: 2018-08-24
Payer: COMMERCIAL

## 2018-08-24 PROCEDURE — 97110 THERAPEUTIC EXERCISES: CPT

## 2018-08-24 PROCEDURE — 97112 NEUROMUSCULAR REEDUCATION: CPT

## 2018-08-24 NOTE — PROGRESS NOTES
PHYSICAL THERAPY - DAILY TREATMENT NOTE    Patient Name: Cal Graham        Date: 2018  : 1975   YES Patient  Verified  Visit #:   10   of   12  Insurance: Payor: Norm Gupta / Plan: [employer] [facility] HMO / Product Type: HMO /      In time: 10:30 Out time: 11:05   Total Treatment Time: 35     TREATMENT AREA = Low back pain [M54.5]    SUBJECTIVE    Pain Level (on 0 to 10 scale):  0  / 10   Medication Changes/New allergies or changes in medical history, any new surgeries or procedures? NO    If yes, update Summary List   Subjective Functional Status/Changes:  []  No changes reported     \"I'm feeling a lot better since last time. Also been doing the (retractions) when I get tension \"          OBJECTIVE  Therapeutic Procedures:  Min Procedure Specifics + Rationale   n/a [x]  Patient Education (performed throughout session) [x] Review HEP    [] Progressed/Changed HEP based on:   [] proper performance and advancement of Therex/TA   [] reduction in pain level    [] increased functional capacity       [] change in directional preference   10 [x] Therapeutic Exercise   [x]  See Flowsheet   Rationale: increase ROM and increase strength to improve the patients ability to participate in ADL's    10 [x] Neuromuscular Re-ed   []  See Flowsheet;  OOV- Dead bug progression UE/LE with head nods, UE horizontal ab/adduction, march, scap stab 1-2-3, BKFO, H/L Butterflies, H/L Butterflies with Scap Stab 1  Rationale: increase ROM, increase strength, improve coordination, improve balance and increase proprioception  to improve the patients ability to safely participate in ADL's       Other Objective/Functional Measures:    Used BS to maintain greater stability during OOV H/L therex.       Post Treatment Pain Level (on 0 to 10) scale:   0  / 10     ASSESSMENT    Assessment/Changes in Function:     Improvement in stability on OOV         Patient will continue to benefit from skilled PT services to instruct in home and community integration  to attain remaining goals   Progress toward goals / Updated goals:    1st session since progress note. No significant progress observed       PLAN    [x]  Upgrade activities as tolerated  [x]  Update interventions per flow sheet YES Continue plan of care   []  Discharge due to :    []  Other:      Therapist: Ace Blair \"BJ\" GIUSEPPE Kelly, Cert. MDT, Cert. DN, Cert.  SMT    Date: 8/24/2018 Time: 10:31 AM   Future Appointments  Date Time Provider Aga Velazco   8/29/2018 9:30 AM Ant Cons, PT [facility]   8/31/2018 10:30 AM Ant Cons, PT [facility]

## 2018-08-29 ENCOUNTER — HOSPITAL ENCOUNTER (OUTPATIENT)
Dept: PHYSICAL THERAPY | Age: 43
Discharge: HOME | End: 2018-08-29
Payer: COMMERCIAL

## 2018-08-29 PROCEDURE — 97112 NEUROMUSCULAR REEDUCATION: CPT

## 2018-08-29 PROCEDURE — 97110 THERAPEUTIC EXERCISES: CPT

## 2018-08-29 NOTE — PROGRESS NOTES
PHYSICAL THERAPY - DAILY TREATMENT NOTE Patient Name: Mikal Tong        Date: 2018 : 1975   YES Patient  Verified Visit #:   45   of   12+  Insurance: Payor: Crow Le / Plan: [employer] HMO / Product Type: HMO /   
 
In time: 9:33 Out time: 10:23 Total Treatment Time: 50 TREATMENT AREA = Low back pain [M54.5] SUBJECTIVE Pain Level (on 0 to 10 scale):  0  / 10 Medication Changes/New allergies or changes in medical history, any new surgeries or procedures? NO    If yes, update Summary List  
Subjective Functional Status/Changes:  []  No changes reported \"No symptoms today. \"  
 
  
 
OBJECTIVE Therapeutic Procedures: 
Min Procedure Specifics + Rationale  
n/a [x]  Patient Education (performed throughout session) [x] Review HEP [] Progressed/Changed HEP based on:  
[] proper performance and advancement of Therex/TA [] reduction in pain level   
[] increased functional capacity [] change in directional preference 25 [x] Therapeutic Exercise [x]  See Flowsheet Rationale: increase ROM and increase strength to improve the patients ability to participate in ADL's   
25 [x] Neuromuscular Re-ed [x]  See Flowsheet;  Helms Kianna,, bridges BUE flexion, unilateral UE flexion, March, SLR, UE Horizontal Abduction, BKFO, UE/LE contralateral Fall outs, clams, Abduction, scap stab #4 Rationale: increase ROM, increase strength, improve coordination, improve balance and increase proprioception  to improve the patients ability to safely participate in ADL's Other Objective/Functional Measures: 
 
therex revised per flow sheet Post Treatment Pain Level (on 0 to 10) scale:   0  / 10 ASSESSMENT Assessment/Changes in Function:  
 
  
  
  
Patient will continue to benefit from skilled PT services to modify and progress therapeutic interventions, address functional mobility deficits, address ROM deficits, address strength deficits, analyze and address soft tissue restrictions, analyze and cue movement patterns, analyze and modify body mechanics/ergonomics and instruct in home and community integration  to attain remaining goals Progress toward goals / Updated goals: 
 
Possible DC in 2-4 sessions PLAN [x]  Upgrade activities as tolerated 
[x]  Update interventions per flow sheet YES Continue plan of care  
[]  Discharge due to :   
[]  Other:   
 
Therapist: Juan A Ramirez \"BJ\" GIUSEPPE Kelly, Cert. MDT, Cert. DN, Cert. SMT Date: 8/29/2018 Time: 9:43 AM  
Future Appointments Date Time Provider Aga Velazco 8/31/2018 8:30 AM Yamilex Salinas PT [facility]

## 2018-08-31 ENCOUNTER — HOSPITAL ENCOUNTER (OUTPATIENT)
Dept: PHYSICAL THERAPY | Age: 43
Discharge: HOME | End: 2018-08-31
Payer: COMMERCIAL

## 2018-08-31 PROCEDURE — 97112 NEUROMUSCULAR REEDUCATION: CPT

## 2018-08-31 PROCEDURE — 97110 THERAPEUTIC EXERCISES: CPT

## 2018-08-31 NOTE — PROGRESS NOTES
PHYSICAL THERAPY - DAILY TREATMENT NOTE Patient Name: Carson Harper        Date: 2018 : 1975   YES Patient  Verified Visit #:   15   of   14  Insurance: Payor: Roxane Malhotra / Plan: [employer] / Product Type: HMO /   
 
In time: 8:32 Out time: 9:02 Total Treatment Time: 30 TREATMENT AREA = Low back pain [M54.5] SUBJECTIVE Pain Level (on 0 to 10 scale):  0  / 10 Medication Changes/New allergies or changes in medical history, any new surgeries or procedures? NO    If yes, update Summary List  
Subjective Functional Status/Changes:  []  No changes reported \"how should I approach the exercises when I finish therapy? \" OBJECTIVE Therapeutic Procedures: 
Min Procedure Specifics + Rationale  
n/a [x]  Patient Education (performed throughout session) [x] Review HEP [] Progressed/Changed HEP based on:  
[] proper performance and advancement of Therex/TA [] reduction in pain level   
[] increased functional capacity [] change in directional preference 20 [x] Therapeutic Exercise [x]  See Flowsheet Rationale: increase ROM and increase strength to improve the patients ability to participate in ADL's   
10 [x] Neuromuscular Re-ed [x]  See Flowsheet;  OOV- SLR, Abduction, Planks Rationale: increase ROM, increase strength, improve coordination, improve balance and increase proprioception  to improve the patients ability to safely participate in ADL's Other Objective/Functional Measures: 
 
Discussed DC planning, patient agreed. Post Treatment Pain Level (on 0 to 10) scale:   0  / 10 ASSESSMENT Assessment/Changes in Function: Excellent progression towards DC Patient will continue to benefit from skilled PT services to instruct in home and community integration  to attain remaining goals Progress toward goals / Updated goals: 
 
Excellent, will dC NV if asymptomatic PLAN [x]  Upgrade activities as tolerated [x]  Update interventions per flow sheet YES Continue plan of care  
[]  Discharge due to :   
[]  Other:   
 
Therapist: Mary Jo Zee \"SHAHID\" GIUSEPPE Kelly, Cert. MDT, Cert. DN, Cert. SMT Date: 8/31/2018 Time: 8:47 AM  
No future appointments.

## 2018-09-14 ENCOUNTER — HOSPITAL ENCOUNTER (OUTPATIENT)
Dept: PHYSICAL THERAPY | Age: 43
Discharge: HOME | End: 2018-09-14
Payer: COMMERCIAL

## 2018-09-14 PROCEDURE — 97110 THERAPEUTIC EXERCISES: CPT

## 2018-09-14 NOTE — PROGRESS NOTES
PHYSICAL THERAPY - DAILY TREATMENT NOTE Patient Name: Ronaldo Omalley        Date: 2018 : 1975   YES Patient  Verified Visit #:   15   of   14  Insurance: Payor: Victorina Barry / Plan: [employer] / Product Type: HMO /   
 
In time: 7:55 Out time: 8:33 Total Treatment Time: 45 TREATMENT AREA = Low back pain [M54.5] SUBJECTIVE Pain Level (on 0 to 10 scale):  0  / 10 Medication Changes/New allergies or changes in medical history, any new surgeries or procedures? NO    If yes, update Summary List  
Subjective Functional Status/Changes:  []  No changes reported \"I've been able to incorporate everything into my yoga. If I ever get anything in my leg, I go ahead and do my extensions and it goes away. \"  
 
  
 
OBJECTIVE Therapeutic Procedures: 
Min Procedure Specifics + Rationale  
n/a [x]  Patient Education (performed throughout session) [x] Review HEP [] Progressed/Changed HEP based on:  
[] proper performance and advancement of Therex/TA [] reduction in pain level   
[] increased functional capacity [] change in directional preference 38 [x] Therapeutic Exercise [x]  See Flowsheet Rationale: increase ROM and increase strength to improve the patients ability to participate in ADL's Other Objective/Functional Measures: 
 
See DC Post Treatment Pain Level (on 0 to 10) scale:   0  / 10 ASSESSMENT Assessment/Changes in Function:  
 
See DC Patient will continue to benefit from skilled PT services to n/a  to attain remaining goals Progress toward goals / Updated goals: 
 
See DC  
 
PLAN [x]  Upgrade activities as tolerated 
[x]  Update interventions per flow sheet NO Continue plan of care  
[]  Discharge due to :   
[]  Other:   
 
Therapist: Juan A Ramirez \"BJ\" GIUSEPPE Kelly, Cert. MDT, Cert. DN, Cert. SMT Date: 2018 Time: 8:09 AM  
Future Appointments Date Time Provider Aga Velazco 2018 8:00 AM Yamilex Salinas [facility] AT [facility]

## 2018-09-14 NOTE — PROGRESS NOTES
[address]  [facility] PHYSICAL THERAPY 
[address], [place], [address] - Phone: [phone]  Fax: [phone] DISCHARGE SUMMARY Patient Name: Chelo Wilkins : 1975 Treatment/Medical Diagnosis: Low back pain [M54.5] Referral Source: Angelica Dubois MD    
Date of Initial Visit: 18 Attended Visits: 13 Missed Visits: 0  
 
SUMMARY OF TREATMENT Patient's POC has consisted of therex, therapeutic activities, manual therapy prn, modalities prn, pt. education, and a comprehensive HEP. Treatment strategies used to address functional mobility deficits, ROM deficits, strength deficits, analyze and address soft tissue restrictions, analyze and cue movement patterns, analyze and modify body mechanics/ergonomics, assess and modify postural abnormalities and instruct in home and community integration. CURRENT STATUS Patient is fully independent in performing all of her HEP, and has incorporated them into her recreational activity. She reports minimal to no recurrence of symptoms and is capable of self care. GOALS/MEASURE OF PROGRESS Goal Met? 1. Pt will be independent with HEP at D/C for self management. 2. Pt will tolerate a weeks worth of desired recreational activity with </=1/10 low back pain in order to return to PLOF MET 
 
MET  
 
G-Codes: n/a 
RECOMMENDATIONS Discontinue therapy. Progressing towards or have reached established goals. If you have any questions/comments please contact us directly at [phone]. Thank you for allowing us to assist in the care of your patient. Therapist Signature: Michael Purcell \"SHAHID\" Yuliana Verma, DPT, Cert. MDT, Cert. DN, Cert. SMT Date: 18 Reporting Period: n/a Certification Period n/a Time: 8:32 AM  
 
NOTE TO PHYSICIAN:  PLEASE COMPLETE THE ORDERS BELOW AND FAX TO [facility] Physical Therapy: [phone] If you are unable to process this request in 24 hours please contact our office: [phone] ___ I have read the above report and request that my patient continue as recommended.  
___ I have read the above report and request that my patient continue therapy with the following changes/special instructions:_________________________________________________________  
___ I have read the above report and request that my patient be discharged from therapy.   
 
Physician Signature:        Date:     Time:

## 2018-09-28 ENCOUNTER — APPOINTMENT (OUTPATIENT)
Dept: PHYSICAL THERAPY | Age: 43
End: 2018-09-28
Payer: COMMERCIAL